# Patient Record
Sex: MALE | Race: WHITE
[De-identification: names, ages, dates, MRNs, and addresses within clinical notes are randomized per-mention and may not be internally consistent; named-entity substitution may affect disease eponyms.]

---

## 2018-09-18 ENCOUNTER — HOSPITAL ENCOUNTER (OUTPATIENT)
Dept: HOSPITAL 80 - FIMAGING | Age: 48
End: 2018-09-18
Attending: ORTHOPAEDIC SURGERY
Payer: COMMERCIAL

## 2018-09-18 DIAGNOSIS — S43.431A: Primary | ICD-10-CM

## 2018-09-18 DIAGNOSIS — M75.52: ICD-10-CM

## 2018-09-18 DIAGNOSIS — M24.812: ICD-10-CM

## 2019-03-07 ENCOUNTER — HOSPITAL ENCOUNTER (EMERGENCY)
Dept: HOSPITAL 80 - CED | Age: 49
Discharge: HOME | End: 2019-03-07
Payer: COMMERCIAL

## 2019-03-07 VITALS — DIASTOLIC BLOOD PRESSURE: 67 MMHG | SYSTOLIC BLOOD PRESSURE: 121 MMHG

## 2019-03-07 DIAGNOSIS — E86.9: ICD-10-CM

## 2019-03-07 DIAGNOSIS — R19.7: Primary | ICD-10-CM

## 2019-03-07 NOTE — EDPHY
H & P


Stated Complaint: Diarrhea,liquid Tue-Wed, with today abd cramping,denies fever 

but chills


Time Seen by Provider: 03/07/19 17:16


HPI/ROS: 





48-year-old male presents complaining of diarrhea and crampy abdominal pain for 

the last 3 days.  No unusual food intake.


No friends or family who are currently ill.  No nausea or vomiting and no fever.


He has not noticed blood in his diarrhea.








Review of systems


As per HPI


General no fever no chills no weakness


HEENT no eye pain no eye discharge. No eye redness, no sore throat


Respiratory no cough, no shortness of breath


Cardiac no chest pain, no peripheral edema


GI positive crampy abdominal pain, positive diarrhea, no constipation, no nausea

, no vomiting


  no flank pain, no hematuria, no dysuria


Musculoskeletal no myalgias, no joint pain


Heme  no easy bruising, no easy bleeding


Endo no polyuria, no polydipsia


Skin no rashes, no pruritus


Neuro no syncope, no dizziness, no headaches


Psych is no suicidal ideation, no homicidal ideation





Source: Patient, Family


Exam Limitations: No limitations





- Personal History


Current Tetanus Diphtheria and Acellular Pertussis (TDAP): Unsure





- Medical/Surgical History


Hx Asthma: No


Hx Chronic Respiratory Disease: No


Hx Diabetes: No


Hx Cardiac Disease: No


Hx Renal Disease: No


Hx Cirrhosis: No


Hx Alcoholism: No


Hx HIV/AIDS: No


Hx Splenectomy or Spleen Trauma: No


Other PMH: Med hx-none.  Surg-left ankle





- Family History


Significant Family History: No pertinent family hx





- Social History


Smoking Status: Never smoked


Alcohol Use: Occasionally


Drug Use: None





- Physical Exam


Exam: 


48-year-old male


HEENT atraumatic normocephalic, extraocular muscles intact, anicteric


Oropharynx negative for erythema negative exudate, tolerating her own secretions


Neck supple no meningismus


Lungs clear to auscultation bilaterally


Heart regular rate and rhythm without murmur rub or gallop


Abdomen nondistended normoactive bowel sounds soft nontender, no guarding no 

rebound, no masses


Back no CVA tenderness, no step-offs, no spinal tenderness


Extremities no cyanosis clubbing or edema


Neuro alert and oriented, no focal deficits


Constitutional: 


 Initial Vital Signs











Temperature (C)  36.7 C   03/07/19 17:16


 


Heart Rate  47 L  03/07/19 17:16


 


Respiratory Rate  16   03/07/19 17:16


 


Blood Pressure  138/79 H  03/07/19 17:16


 


O2 Sat (%)  100   03/07/19 17:16








 











O2 Delivery Mode               Room Air














Allergies/Adverse Reactions: 


 





No Known Allergies Allergy (Verified 03/07/19 17:16)


 








Home Medications: 














 Medication  Instructions  Recorded


 


Dicyclomine [Bentyl 20 MG (*)] 20 mg PO Q8 PRN #21 tab 03/07/19














Medical Decision Making


ED Course/Re-evaluation: 





Patient seen and evaluated for diarrhea and crampy abdominal pain.





IV established


CBC, CMP, lactate ordered


GI stool pathogen panel ordered





1 L normal saline ordered





labs all wnl


stool results pending





pt had some crampy abdominal pain, given dicyclomine 20 mg po





Imp


diarrhea, likely viral








Plan


Home


given rx for Bentyl


stool pathogen panel pending





f/u pcp





given education on reasons to return-fever, worsened pain, vomiting





Differential Diagnosis: 





Differential diagnosis considered but not limited to:


Infectious diarrhea, nonspecific diarrhea, irritable bowel syndrome, 

diverticulitis, appendicitis, a viral gastroenteritis





- Data Points


Laboratory Results: 


 











  03/07/19 03/07/19





  18:19 18:17


 


POC Sodium    140 mEq/L mEq/L





    (135-145) 


 


POC Potassium    3.5 mEq/L mEq/L





    (3.3-5.0) 


 


POC Chloride    103.0 mEq/L mEq/L





    () 


 


POC Total CO2    29 mEq/L mEq/L





    (22-31) 


 


POC BUN    8 mg/dL mg/dL





    (7-23) 


 


POC Creatinine    1.0 mg/dL mg/dL





    (0.7-1.3) 


 


POC Glucose    118 mg/dL H mg/dL





    () 


 


POC Lactic Acid Neftali  1.5 mmol/L mmol/L  





   (0.7-2.1)  


 


POC Calcium    9.8 mg/dL mg/dL





    (8.5-10.4) 


 


POC Total Bilirubin    1.1 mg/dL mg/dL





    (0.1-1.4) 


 


POC AST    26 IU/L IU/L





    (17-59) 


 


POC ALT    17 IU/L L IU/L





    (21-72) 


 


POC Alk Phosphatase    34 IU/L L IU/L





    () 


 


POC Total Protein    7.0 g/dL g/dL





    (6.3-8.2) 


 


POC Albumin    3.8 g/dL g/dL





    (3.5-5.0) 











Medications Given: 


 








Discontinued Medications





Dicyclomine HCl (Bentyl)  20 mg PO EDNOW ONE


   Stop: 03/07/19 18:55


   Last Admin: 03/07/19 19:03 Dose:  20 mg


Sodium Chloride (Ns)  1,000 mls @ 0 mls/hr IV ONCE ONE


   PRN Reason: Wide Open


   Stop: 03/07/19 17:51


   Last Admin: 03/07/19 18:05 Dose:  1,000 mls





Point of Care Test Results: 


 CBC











CBC Collection Date            03/07/19


 


CBC Collection Time            17:55


 


WBC                            5.26


 


RBC                            4.92


 


HGB                            15.2


 


HCT                            45.3


 


PLT                            202


 


Neut #                         3.17


 


Neut                           60.2


 


LYMPH #                        1.63


 


LYMPH                          31.0


 


MCV                            92.1











 Chemistry











  03/07/19





  18:17


 


POC Sodium  140 mEq/L mEq/L





   (135-145) 


 


POC Potassium  3.5 mEq/L mEq/L





   (3.3-5.0) 


 


POC Chloride  103.0 mEq/L mEq/L





   () 


 


POC Total CO2  29 mEq/L mEq/L





   (22-31) 


 


POC BUN  8 mg/dL mg/dL





   (7-23) 


 


POC Creatinine  1.0 mg/dL mg/dL





   (0.7-1.3) 


 


POC Glucose  118 mg/dL H mg/dL





   () 


 


POC Calcium  9.8 mg/dL mg/dL





   (8.5-10.4) 


 


POC Total Bilirubin  1.1 mg/dL mg/dL





   (0.1-1.4) 


 


POC AST  26 IU/L IU/L





   (17-59) 


 


POC ALT  17 IU/L L IU/L





   (21-72) 


 


POC Alk Phosphatase  34 IU/L L IU/L





   () 


 


POC Total Protein  7.0 g/dL g/dL





   (6.3-8.2) 


 


POC Albumin  3.8 g/dL g/dL





   (3.5-5.0) 








 Blood Gas/Lactic Acid-Venous











  03/07/19





  18:19


 


POC Lactic Acid Neftali  1.5 mmol/L mmol/L





   (0.7-2.1) 














Departure





- Departure


Disposition: Home, Routine, Self-Care


Clinical Impression: 


 Diarrhea





Condition: Good


Instructions:  Dicyclomine (By mouth), Acute Diarrhea (ED)


Referrals: 


Eugenio Kimble MD [Primary Care Provider] - As per Instructions


Prescriptions: 


Dicyclomine [Bentyl 20 MG (*)] 20 mg PO Q8 PRN #21 tab


 PRN Reason: Gi Distress